# Patient Record
Sex: MALE | Race: WHITE | Employment: OTHER | URBAN - METROPOLITAN AREA
[De-identification: names, ages, dates, MRNs, and addresses within clinical notes are randomized per-mention and may not be internally consistent; named-entity substitution may affect disease eponyms.]

---

## 2024-05-23 ENCOUNTER — HOSPITAL ENCOUNTER (OUTPATIENT)
Facility: HOSPITAL | Age: 79
Discharge: HOME | End: 2024-05-23
Payer: MEDICARE

## 2024-05-23 DIAGNOSIS — Z01.818 PRE-OP TESTING: Primary | ICD-10-CM

## 2024-05-23 LAB
ALBUMIN SERPL-MCNC: 3.7 G/DL (ref 3.4–5)
ALBUMIN/GLOB SERPL: 1.3 (ref 0.8–1.7)
ALP SERPL-CCNC: 89 U/L (ref 45–117)
ALT SERPL-CCNC: 20 U/L (ref 16–61)
ANION GAP SERPL CALC-SCNC: 5 MMOL/L (ref 3–18)
AST SERPL-CCNC: 12 U/L (ref 10–38)
BASOPHILS # BLD: 0.1 K/UL (ref 0–0.1)
BASOPHILS NFR BLD: 1 % (ref 0–2)
BILIRUB SERPL-MCNC: 0.5 MG/DL (ref 0.2–1)
BUN SERPL-MCNC: 19 MG/DL (ref 7–18)
BUN/CREAT SERPL: 18 (ref 12–20)
CALCIUM SERPL-MCNC: 9.6 MG/DL (ref 8.5–10.1)
CHLORIDE SERPL-SCNC: 103 MMOL/L (ref 100–111)
CO2 SERPL-SCNC: 31 MMOL/L (ref 21–32)
CREAT SERPL-MCNC: 1.04 MG/DL (ref 0.6–1.3)
DIFFERENTIAL METHOD BLD: NORMAL
EOSINOPHIL # BLD: 0.2 K/UL (ref 0–0.4)
EOSINOPHIL NFR BLD: 3 % (ref 0–5)
ERYTHROCYTE [DISTWIDTH] IN BLOOD BY AUTOMATED COUNT: 14.4 % (ref 11.6–14.5)
GLOBULIN SER CALC-MCNC: 2.9 G/DL (ref 2–4)
GLUCOSE SERPL-MCNC: 124 MG/DL (ref 74–99)
HCT VFR BLD AUTO: 40.9 % (ref 36–48)
HGB BLD-MCNC: 13.6 G/DL (ref 13–16)
IMM GRANULOCYTES # BLD AUTO: 0 K/UL (ref 0–0.04)
IMM GRANULOCYTES NFR BLD AUTO: 0 % (ref 0–0.5)
LYMPHOCYTES # BLD: 2.1 K/UL (ref 0.9–3.6)
LYMPHOCYTES NFR BLD: 33 % (ref 21–52)
MCH RBC QN AUTO: 28.6 PG (ref 24–34)
MCHC RBC AUTO-ENTMCNC: 33.3 G/DL (ref 31–37)
MCV RBC AUTO: 86.1 FL (ref 78–100)
MONOCYTES # BLD: 0.6 K/UL (ref 0.05–1.2)
MONOCYTES NFR BLD: 9 % (ref 3–10)
NEUTS SEG # BLD: 3.5 K/UL (ref 1.8–8)
NEUTS SEG NFR BLD: 55 % (ref 40–73)
NRBC # BLD: 0 K/UL (ref 0–0.01)
NRBC BLD-RTO: 0 PER 100 WBC
PLATELET # BLD AUTO: 207 K/UL (ref 135–420)
PMV BLD AUTO: 10.9 FL (ref 9.2–11.8)
POTASSIUM SERPL-SCNC: 3.4 MMOL/L (ref 3.5–5.5)
PROT SERPL-MCNC: 6.6 G/DL (ref 6.4–8.2)
RBC # BLD AUTO: 4.75 M/UL (ref 4.35–5.65)
SODIUM SERPL-SCNC: 139 MMOL/L (ref 136–145)
WBC # BLD AUTO: 6.5 K/UL (ref 4.6–13.2)

## 2024-05-23 PROCEDURE — 93005 ELECTROCARDIOGRAM TRACING: CPT | Performed by: ORTHOPAEDIC SURGERY

## 2024-05-23 PROCEDURE — 80053 COMPREHEN METABOLIC PANEL: CPT

## 2024-05-23 PROCEDURE — 85025 COMPLETE CBC W/AUTO DIFF WBC: CPT

## 2024-05-24 LAB
EKG ATRIAL RATE: 50 BPM
EKG DIAGNOSIS: NORMAL
EKG P AXIS: 53 DEGREES
EKG P-R INTERVAL: 144 MS
EKG Q-T INTERVAL: 498 MS
EKG QRS DURATION: 94 MS
EKG QTC CALCULATION (BAZETT): 454 MS
EKG R AXIS: -35 DEGREES
EKG T AXIS: -1 DEGREES
EKG VENTRICULAR RATE: 50 BPM

## 2024-05-25 LAB
BACTERIA SPEC CULT: NORMAL
BACTERIA SPEC CULT: NORMAL
SERVICE CMNT-IMP: NORMAL

## 2024-06-09 NOTE — H&P
Take 3 tablets by mouth daily      esomeprazole Magnesium (NEXIUM) 20 MG PACK Take 1 packet by mouth daily as needed      metoprolol succinate (TOPROL XL) 50 MG extended release tablet Take 1 tablet by mouth daily      hydroCHLOROthiazide (HYDRODIURIL) 25 MG tablet Take 1 tablet by mouth daily      lisinopril (PRINIVIL;ZESTRIL) 10 MG tablet Take 1 tablet by mouth daily      simvastatin (ZOCOR) 40 MG tablet Take 1 tablet by mouth daily      Potassium Chloride 10 MEQ PACK Take 20 mEq by mouth daily      LORazepam (ATIVAN) 0.5 MG tablet Take 1 tablet by mouth 2 times daily as needed for Anxiety. Indications: anxiety Max Daily Amount: 1 mg      traZODone (DESYREL) 50 MG tablet Take 1 tablet by mouth nightly as needed for Sleep      dicyclomine (BENTYL) 20 MG tablet Take 1 tablet by mouth daily as needed         ROS:  Denies chills, fever,night sweats,  bowel or bladder dysfunction, unexplained weight loss/weight gain, chest pain, sob or anxiety.    Physical Examination    Gen: Well developed, well nourished 78 y.o. male. He has weakness in his EHL and tib ant with normal strength of the hamstrings and quads. He is limited in duration and ability to ambulate. He does have subtle numbness as well.    Assessment and Plan    It appears that the patient is experiencing symptomatic residual and junctional stenosis. Due to the pt's persistent symptoms Vern Stevens is being admitted to undergo surgical intervention. The post-operative plan of care consists of physical therapy/home health if indicated and a 2 week f/u office visit.  The risks, benefits, complications and alternatives to surgery have been discussed in detail with the patient.  The patient understands and agrees to proceed.

## 2024-06-11 NOTE — DISCHARGE INSTRUCTIONS
Dr. Ruiz's Post-Operative Instructions Thoracic/Lumbar/Sacral Spine Surgery    DO NOT take any NSAIDS if you had Fusion Surgery.     ACTIVITIES:  *The first week after surgery   Change positions every hour while you are awake.  Walking is the best way to rebuild strength.   Activities around the house, such as washing dishes and preparing light meals are fine.   Avoid strenuous activities, such as vacuuming, and do not lift anything heavier than 1 gallon of milk (or about 5-8 pounds).   Do not bend over to  items from the ground level until 3 months post-op.    *Week 2 and beyond  You may gradually increase your activities, but avoid heavy lifting, pushing/pulling.   Walk at a pace that avoids fatigue or severe pain. Do not try to walk several blocks the first day! As you increase the distance, you may feel tired. If so, stop and rest.   Follow-up with Dr. Ruiz will be 2 weeks after surgery.    BATHING and INCISION CARE:  The incision may be tender or feel numb: this is normal.   Keep the incision clean and dry. You may shower 3 days after surgery. Cover the dressing with saran wrap before getting in the shower. The incision is closed with sutures under the skin and glue on top.   Do not apply any lotions, ointments or oils on the incision.   Do not remove the dressing. Your dressing will be changed at your first post op appointment. If it comes loose or is damaged, dirty or wet before this appointment, call your home health nurse (if you are being seen by a nurse at home) or the office to have the dressing changed.  If you notice any excessive swelling, redness, or persistent drainage around the incision, notify the office immediately.    CONSTIPATION:  Take a stool softener twice a day while you are taking a narcotic.   If you have not had a bowel movement within 3 days of surgery, you will need to use a laxative or suppository that can be obtained over the counter at your local pharmacy     ICE  Use

## 2024-06-12 ENCOUNTER — HOSPITAL ENCOUNTER (OUTPATIENT)
Facility: HOSPITAL | Age: 79
Setting detail: OBSERVATION
Discharge: HOME HEALTH | End: 2024-06-13
Attending: ORTHOPAEDIC SURGERY | Admitting: ORTHOPAEDIC SURGERY
Payer: MEDICARE

## 2024-06-12 ENCOUNTER — APPOINTMENT (OUTPATIENT)
Facility: HOSPITAL | Age: 79
End: 2024-06-12
Attending: ORTHOPAEDIC SURGERY
Payer: MEDICARE

## 2024-06-12 ENCOUNTER — ANESTHESIA EVENT (OUTPATIENT)
Facility: HOSPITAL | Age: 79
End: 2024-06-12
Payer: MEDICARE

## 2024-06-12 ENCOUNTER — ANESTHESIA (OUTPATIENT)
Facility: HOSPITAL | Age: 79
End: 2024-06-12
Payer: MEDICARE

## 2024-06-12 DIAGNOSIS — Z98.890 S/P LAMINECTOMY: Primary | ICD-10-CM

## 2024-06-12 PROBLEM — M48.061 SPINAL STENOSIS OF LUMBAR REGION AT MULTIPLE LEVELS: Status: ACTIVE | Noted: 2024-06-12

## 2024-06-12 PROCEDURE — 2720000010 HC SURG SUPPLY STERILE: Performed by: ORTHOPAEDIC SURGERY

## 2024-06-12 PROCEDURE — 2580000003 HC RX 258: Performed by: ORTHOPAEDIC SURGERY

## 2024-06-12 PROCEDURE — 2709999900 HC NON-CHARGEABLE SUPPLY: Performed by: ORTHOPAEDIC SURGERY

## 2024-06-12 PROCEDURE — 7100000001 HC PACU RECOVERY - ADDTL 15 MIN: Performed by: ORTHOPAEDIC SURGERY

## 2024-06-12 PROCEDURE — 6360000002 HC RX W HCPCS: Performed by: ORTHOPAEDIC SURGERY

## 2024-06-12 PROCEDURE — 6360000002 HC RX W HCPCS: Performed by: NURSE ANESTHETIST, CERTIFIED REGISTERED

## 2024-06-12 PROCEDURE — 3600000002 HC SURGERY LEVEL 2 BASE: Performed by: ORTHOPAEDIC SURGERY

## 2024-06-12 PROCEDURE — G0378 HOSPITAL OBSERVATION PER HR: HCPCS

## 2024-06-12 PROCEDURE — 2500000003 HC RX 250 WO HCPCS: Performed by: NURSE ANESTHETIST, CERTIFIED REGISTERED

## 2024-06-12 PROCEDURE — 6360000002 HC RX W HCPCS: Performed by: STUDENT IN AN ORGANIZED HEALTH CARE EDUCATION/TRAINING PROGRAM

## 2024-06-12 PROCEDURE — C1729 CATH, DRAINAGE: HCPCS | Performed by: ORTHOPAEDIC SURGERY

## 2024-06-12 PROCEDURE — 6370000000 HC RX 637 (ALT 250 FOR IP): Performed by: ORTHOPAEDIC SURGERY

## 2024-06-12 PROCEDURE — 7100000000 HC PACU RECOVERY - FIRST 15 MIN: Performed by: ORTHOPAEDIC SURGERY

## 2024-06-12 PROCEDURE — 77002 NEEDLE LOCALIZATION BY XRAY: CPT

## 2024-06-12 PROCEDURE — 2500000003 HC RX 250 WO HCPCS: Performed by: ORTHOPAEDIC SURGERY

## 2024-06-12 PROCEDURE — 3700000001 HC ADD 15 MINUTES (ANESTHESIA): Performed by: ORTHOPAEDIC SURGERY

## 2024-06-12 PROCEDURE — 3700000000 HC ANESTHESIA ATTENDED CARE: Performed by: ORTHOPAEDIC SURGERY

## 2024-06-12 PROCEDURE — 3600000012 HC SURGERY LEVEL 2 ADDTL 15MIN: Performed by: ORTHOPAEDIC SURGERY

## 2024-06-12 RX ORDER — LIDOCAINE HYDROCHLORIDE 20 MG/ML
INJECTION, SOLUTION EPIDURAL; INFILTRATION; INTRACAUDAL; PERINEURAL PRN
Status: DISCONTINUED | OUTPATIENT
Start: 2024-06-12 | End: 2024-06-12 | Stop reason: SDUPTHER

## 2024-06-12 RX ORDER — METAXALONE 800 MG/1
800 TABLET ORAL EVERY 8 HOURS PRN
Status: DISCONTINUED | OUTPATIENT
Start: 2024-06-12 | End: 2024-06-13 | Stop reason: HOSPADM

## 2024-06-12 RX ORDER — ESOMEPRAZOLE MAGNESIUM 20 MG/1
20 GRANULE, DELAYED RELEASE ORAL 2 TIMES DAILY
Status: DISCONTINUED | OUTPATIENT
Start: 2024-06-12 | End: 2024-06-12 | Stop reason: CLARIF

## 2024-06-12 RX ORDER — FENTANYL CITRATE 50 UG/ML
50 INJECTION, SOLUTION INTRAMUSCULAR; INTRAVENOUS EVERY 5 MIN PRN
Status: COMPLETED | OUTPATIENT
Start: 2024-06-12 | End: 2024-06-12

## 2024-06-12 RX ORDER — ONDANSETRON 4 MG/1
4 TABLET, ORALLY DISINTEGRATING ORAL EVERY 8 HOURS PRN
Status: DISCONTINUED | OUTPATIENT
Start: 2024-06-12 | End: 2024-06-13 | Stop reason: HOSPADM

## 2024-06-12 RX ORDER — NALOXONE HYDROCHLORIDE 0.4 MG/ML
INJECTION, SOLUTION INTRAMUSCULAR; INTRAVENOUS; SUBCUTANEOUS PRN
Status: DISCONTINUED | OUTPATIENT
Start: 2024-06-12 | End: 2024-06-12 | Stop reason: HOSPADM

## 2024-06-12 RX ORDER — DROPERIDOL 2.5 MG/ML
0.62 INJECTION, SOLUTION INTRAMUSCULAR; INTRAVENOUS
Status: DISCONTINUED | OUTPATIENT
Start: 2024-06-12 | End: 2024-06-12 | Stop reason: HOSPADM

## 2024-06-12 RX ORDER — DICYCLOMINE HCL 20 MG
20 TABLET ORAL
Status: DISCONTINUED | OUTPATIENT
Start: 2024-06-12 | End: 2024-06-12 | Stop reason: RX

## 2024-06-12 RX ORDER — ROCURONIUM BROMIDE 10 MG/ML
INJECTION, SOLUTION INTRAVENOUS PRN
Status: DISCONTINUED | OUTPATIENT
Start: 2024-06-12 | End: 2024-06-12 | Stop reason: SDUPTHER

## 2024-06-12 RX ORDER — ONDANSETRON 2 MG/ML
4 INJECTION INTRAMUSCULAR; INTRAVENOUS
Status: DISCONTINUED | OUTPATIENT
Start: 2024-06-12 | End: 2024-06-12 | Stop reason: HOSPADM

## 2024-06-12 RX ORDER — ACETAMINOPHEN 325 MG/1
650 TABLET ORAL EVERY 6 HOURS
Status: DISCONTINUED | OUTPATIENT
Start: 2024-06-12 | End: 2024-06-13 | Stop reason: HOSPADM

## 2024-06-12 RX ORDER — DICYCLOMINE HYDROCHLORIDE 10 MG/1
20 CAPSULE ORAL
Status: DISCONTINUED | OUTPATIENT
Start: 2024-06-12 | End: 2024-06-13 | Stop reason: HOSPADM

## 2024-06-12 RX ORDER — LISINOPRIL 5 MG/1
5 TABLET ORAL DAILY
Status: DISCONTINUED | OUTPATIENT
Start: 2024-06-12 | End: 2024-06-13 | Stop reason: HOSPADM

## 2024-06-12 RX ORDER — PROPOFOL 10 MG/ML
INJECTION, EMULSION INTRAVENOUS PRN
Status: DISCONTINUED | OUTPATIENT
Start: 2024-06-12 | End: 2024-06-12 | Stop reason: SDUPTHER

## 2024-06-12 RX ORDER — POLYETHYLENE GLYCOL 3350 17 G/17G
17 POWDER, FOR SOLUTION ORAL DAILY
Status: DISCONTINUED | OUTPATIENT
Start: 2024-06-12 | End: 2024-06-13 | Stop reason: HOSPADM

## 2024-06-12 RX ORDER — BUPIVACAINE HYDROCHLORIDE AND EPINEPHRINE 2.5; 5 MG/ML; UG/ML
INJECTION, SOLUTION EPIDURAL; INFILTRATION; INTRACAUDAL; PERINEURAL PRN
Status: DISCONTINUED | OUTPATIENT
Start: 2024-06-12 | End: 2024-06-12 | Stop reason: ALTCHOICE

## 2024-06-12 RX ORDER — SODIUM CHLORIDE, SODIUM LACTATE, POTASSIUM CHLORIDE, CALCIUM CHLORIDE 600; 310; 30; 20 MG/100ML; MG/100ML; MG/100ML; MG/100ML
INJECTION, SOLUTION INTRAVENOUS CONTINUOUS
Status: DISCONTINUED | OUTPATIENT
Start: 2024-06-12 | End: 2024-06-12 | Stop reason: HOSPADM

## 2024-06-12 RX ORDER — VANCOMYCIN HYDROCHLORIDE 1 G/20ML
INJECTION, POWDER, LYOPHILIZED, FOR SOLUTION INTRAVENOUS PRN
Status: DISCONTINUED | OUTPATIENT
Start: 2024-06-12 | End: 2024-06-12 | Stop reason: ALTCHOICE

## 2024-06-12 RX ORDER — SODIUM CHLORIDE 9 MG/ML
INJECTION, SOLUTION INTRAVENOUS PRN
Status: DISCONTINUED | OUTPATIENT
Start: 2024-06-12 | End: 2024-06-12 | Stop reason: HOSPADM

## 2024-06-12 RX ORDER — MIDAZOLAM HYDROCHLORIDE 2 MG/2ML
2 INJECTION, SOLUTION INTRAMUSCULAR; INTRAVENOUS
Status: DISCONTINUED | OUTPATIENT
Start: 2024-06-12 | End: 2024-06-12 | Stop reason: HOSPADM

## 2024-06-12 RX ORDER — BISACODYL 5 MG/1
5 TABLET, DELAYED RELEASE ORAL DAILY
Status: DISCONTINUED | OUTPATIENT
Start: 2024-06-12 | End: 2024-06-13 | Stop reason: HOSPADM

## 2024-06-12 RX ORDER — SODIUM CHLORIDE 0.9 % (FLUSH) 0.9 %
5-40 SYRINGE (ML) INJECTION PRN
Status: DISCONTINUED | OUTPATIENT
Start: 2024-06-12 | End: 2024-06-12 | Stop reason: HOSPADM

## 2024-06-12 RX ORDER — SODIUM CHLORIDE 0.9 % (FLUSH) 0.9 %
5-40 SYRINGE (ML) INJECTION EVERY 12 HOURS SCHEDULED
Status: DISCONTINUED | OUTPATIENT
Start: 2024-06-12 | End: 2024-06-12 | Stop reason: HOSPADM

## 2024-06-12 RX ORDER — DIPHENHYDRAMINE HCL 25 MG
25 CAPSULE ORAL EVERY 6 HOURS PRN
Status: DISCONTINUED | OUTPATIENT
Start: 2024-06-12 | End: 2024-06-13 | Stop reason: HOSPADM

## 2024-06-12 RX ORDER — HYDROMORPHONE HYDROCHLORIDE 1 MG/ML
0.25 INJECTION, SOLUTION INTRAMUSCULAR; INTRAVENOUS; SUBCUTANEOUS
Status: DISCONTINUED | OUTPATIENT
Start: 2024-06-12 | End: 2024-06-13 | Stop reason: HOSPADM

## 2024-06-12 RX ORDER — OXYCODONE HYDROCHLORIDE 5 MG/1
5 TABLET ORAL EVERY 4 HOURS PRN
Status: DISCONTINUED | OUTPATIENT
Start: 2024-06-12 | End: 2024-06-13 | Stop reason: HOSPADM

## 2024-06-12 RX ORDER — METOPROLOL SUCCINATE 50 MG/1
50 TABLET, EXTENDED RELEASE ORAL DAILY
Status: DISCONTINUED | OUTPATIENT
Start: 2024-06-12 | End: 2024-06-13 | Stop reason: HOSPADM

## 2024-06-12 RX ORDER — HYDROMORPHONE HYDROCHLORIDE 1 MG/ML
0.5 INJECTION, SOLUTION INTRAMUSCULAR; INTRAVENOUS; SUBCUTANEOUS
Status: DISCONTINUED | OUTPATIENT
Start: 2024-06-12 | End: 2024-06-13 | Stop reason: HOSPADM

## 2024-06-12 RX ORDER — OXYCODONE HYDROCHLORIDE 5 MG/1
10 TABLET ORAL EVERY 4 HOURS PRN
Status: DISCONTINUED | OUTPATIENT
Start: 2024-06-12 | End: 2024-06-13 | Stop reason: HOSPADM

## 2024-06-12 RX ORDER — TRAZODONE HYDROCHLORIDE 50 MG/1
25 TABLET ORAL NIGHTLY PRN
Status: DISCONTINUED | OUTPATIENT
Start: 2024-06-12 | End: 2024-06-13 | Stop reason: HOSPADM

## 2024-06-12 RX ORDER — DIPHENHYDRAMINE HYDROCHLORIDE 50 MG/ML
25 INJECTION INTRAMUSCULAR; INTRAVENOUS EVERY 6 HOURS PRN
Status: DISCONTINUED | OUTPATIENT
Start: 2024-06-12 | End: 2024-06-13 | Stop reason: HOSPADM

## 2024-06-12 RX ORDER — TRANEXAMIC ACID 10 MG/ML
1000 INJECTION, SOLUTION INTRAVENOUS ONCE
Status: COMPLETED | OUTPATIENT
Start: 2024-06-12 | End: 2024-06-12

## 2024-06-12 RX ORDER — HYDROCHLOROTHIAZIDE 25 MG/1
25 TABLET ORAL DAILY
Status: DISCONTINUED | OUTPATIENT
Start: 2024-06-12 | End: 2024-06-13 | Stop reason: HOSPADM

## 2024-06-12 RX ORDER — SODIUM CHLORIDE, SODIUM LACTATE, POTASSIUM CHLORIDE, CALCIUM CHLORIDE 600; 310; 30; 20 MG/100ML; MG/100ML; MG/100ML; MG/100ML
INJECTION, SOLUTION INTRAVENOUS CONTINUOUS
Status: DISCONTINUED | OUTPATIENT
Start: 2024-06-12 | End: 2024-06-13 | Stop reason: HOSPADM

## 2024-06-12 RX ORDER — DIPHENHYDRAMINE HYDROCHLORIDE 50 MG/ML
12.5 INJECTION INTRAMUSCULAR; INTRAVENOUS
Status: DISCONTINUED | OUTPATIENT
Start: 2024-06-12 | End: 2024-06-12 | Stop reason: HOSPADM

## 2024-06-12 RX ORDER — HYDROMORPHONE HYDROCHLORIDE 2 MG/ML
INJECTION, SOLUTION INTRAMUSCULAR; INTRAVENOUS; SUBCUTANEOUS PRN
Status: DISCONTINUED | OUTPATIENT
Start: 2024-06-12 | End: 2024-06-12 | Stop reason: SDUPTHER

## 2024-06-12 RX ORDER — OXYCODONE HYDROCHLORIDE 5 MG/1
5 TABLET ORAL PRN
Status: DISCONTINUED | OUTPATIENT
Start: 2024-06-12 | End: 2024-06-12 | Stop reason: HOSPADM

## 2024-06-12 RX ORDER — FENTANYL CITRATE 50 UG/ML
INJECTION, SOLUTION INTRAMUSCULAR; INTRAVENOUS PRN
Status: DISCONTINUED | OUTPATIENT
Start: 2024-06-12 | End: 2024-06-12 | Stop reason: SDUPTHER

## 2024-06-12 RX ORDER — LABETALOL HYDROCHLORIDE 5 MG/ML
10 INJECTION, SOLUTION INTRAVENOUS
Status: DISCONTINUED | OUTPATIENT
Start: 2024-06-12 | End: 2024-06-12 | Stop reason: HOSPADM

## 2024-06-12 RX ORDER — POTASSIUM CHLORIDE 1500 MG/1
20 TABLET, EXTENDED RELEASE ORAL DAILY
Status: DISCONTINUED | OUTPATIENT
Start: 2024-06-12 | End: 2024-06-12 | Stop reason: RX

## 2024-06-12 RX ORDER — HYDROMORPHONE HYDROCHLORIDE 1 MG/ML
0.5 INJECTION, SOLUTION INTRAMUSCULAR; INTRAVENOUS; SUBCUTANEOUS EVERY 5 MIN PRN
Status: DISCONTINUED | OUTPATIENT
Start: 2024-06-12 | End: 2024-06-12 | Stop reason: HOSPADM

## 2024-06-12 RX ORDER — DEXAMETHASONE SODIUM PHOSPHATE 4 MG/ML
INJECTION, SOLUTION INTRA-ARTICULAR; INTRALESIONAL; INTRAMUSCULAR; INTRAVENOUS; SOFT TISSUE PRN
Status: DISCONTINUED | OUTPATIENT
Start: 2024-06-12 | End: 2024-06-12 | Stop reason: SDUPTHER

## 2024-06-12 RX ORDER — FAMOTIDINE 20 MG/1
20 TABLET, FILM COATED ORAL 2 TIMES DAILY
Status: DISCONTINUED | OUTPATIENT
Start: 2024-06-12 | End: 2024-06-13 | Stop reason: HOSPADM

## 2024-06-12 RX ORDER — LORAZEPAM 0.5 MG/1
0.5 TABLET ORAL 2 TIMES DAILY PRN
Status: DISCONTINUED | OUTPATIENT
Start: 2024-06-12 | End: 2024-06-13 | Stop reason: HOSPADM

## 2024-06-12 RX ORDER — PANTOPRAZOLE SODIUM 40 MG/1
40 TABLET, DELAYED RELEASE ORAL
Status: DISCONTINUED | OUTPATIENT
Start: 2024-06-13 | End: 2024-06-13 | Stop reason: HOSPADM

## 2024-06-12 RX ORDER — OXYCODONE HYDROCHLORIDE 5 MG/1
10 TABLET ORAL PRN
Status: DISCONTINUED | OUTPATIENT
Start: 2024-06-12 | End: 2024-06-12 | Stop reason: HOSPADM

## 2024-06-12 RX ORDER — MIDAZOLAM HYDROCHLORIDE 1 MG/ML
INJECTION INTRAMUSCULAR; INTRAVENOUS PRN
Status: DISCONTINUED | OUTPATIENT
Start: 2024-06-12 | End: 2024-06-12 | Stop reason: SDUPTHER

## 2024-06-12 RX ORDER — SODIUM CHLORIDE 0.9 % (FLUSH) 0.9 %
5-40 SYRINGE (ML) INJECTION EVERY 12 HOURS SCHEDULED
Status: DISCONTINUED | OUTPATIENT
Start: 2024-06-12 | End: 2024-06-13 | Stop reason: HOSPADM

## 2024-06-12 RX ORDER — ONDANSETRON 2 MG/ML
INJECTION INTRAMUSCULAR; INTRAVENOUS PRN
Status: DISCONTINUED | OUTPATIENT
Start: 2024-06-12 | End: 2024-06-12 | Stop reason: SDUPTHER

## 2024-06-12 RX ORDER — ONDANSETRON 2 MG/ML
4 INJECTION INTRAMUSCULAR; INTRAVENOUS EVERY 6 HOURS PRN
Status: DISCONTINUED | OUTPATIENT
Start: 2024-06-12 | End: 2024-06-13 | Stop reason: HOSPADM

## 2024-06-12 RX ORDER — SODIUM CHLORIDE 450 MG/100ML
INJECTION, SOLUTION INTRAVENOUS CONTINUOUS
Status: DISCONTINUED | OUTPATIENT
Start: 2024-06-12 | End: 2024-06-13 | Stop reason: HOSPADM

## 2024-06-12 RX ORDER — POTASSIUM CHLORIDE 20 MEQ/1
20 TABLET, EXTENDED RELEASE ORAL
Status: DISCONTINUED | OUTPATIENT
Start: 2024-06-12 | End: 2024-06-13 | Stop reason: HOSPADM

## 2024-06-12 RX ADMIN — SODIUM CHLORIDE, POTASSIUM CHLORIDE, SODIUM LACTATE AND CALCIUM CHLORIDE: 600; 310; 30; 20 INJECTION, SOLUTION INTRAVENOUS at 17:32

## 2024-06-12 RX ADMIN — HYDROCHLOROTHIAZIDE 25 MG: 25 TABLET ORAL at 17:45

## 2024-06-12 RX ADMIN — OXYCODONE HYDROCHLORIDE 5 MG: 5 TABLET ORAL at 20:46

## 2024-06-12 RX ADMIN — LIDOCAINE HYDROCHLORIDE 100 MG: 20 INJECTION, SOLUTION EPIDURAL; INFILTRATION; INTRACAUDAL; PERINEURAL at 12:42

## 2024-06-12 RX ADMIN — ACETAMINOPHEN 650 MG: 325 TABLET ORAL at 17:43

## 2024-06-12 RX ADMIN — FENTANYL CITRATE 50 MCG: 50 INJECTION INTRAMUSCULAR; INTRAVENOUS at 15:55

## 2024-06-12 RX ADMIN — SODIUM CHLORIDE, POTASSIUM CHLORIDE, SODIUM LACTATE AND CALCIUM CHLORIDE: 600; 310; 30; 20 INJECTION, SOLUTION INTRAVENOUS at 10:28

## 2024-06-12 RX ADMIN — ACETAMINOPHEN 650 MG: 325 TABLET ORAL at 23:29

## 2024-06-12 RX ADMIN — FENTANYL CITRATE 50 MCG: 50 INJECTION INTRAMUSCULAR; INTRAVENOUS at 15:42

## 2024-06-12 RX ADMIN — LISINOPRIL 5 MG: 5 TABLET ORAL at 17:45

## 2024-06-12 RX ADMIN — HYDROMORPHONE HYDROCHLORIDE 0.25 MG: 2 INJECTION, SOLUTION INTRAMUSCULAR; INTRAVENOUS; SUBCUTANEOUS at 14:28

## 2024-06-12 RX ADMIN — WATER 2000 MG: 1 INJECTION INTRAMUSCULAR; INTRAVENOUS; SUBCUTANEOUS at 12:39

## 2024-06-12 RX ADMIN — WATER 2000 MG: 1 INJECTION INTRAMUSCULAR; INTRAVENOUS; SUBCUTANEOUS at 20:46

## 2024-06-12 RX ADMIN — MIDAZOLAM 2 MG: 1 INJECTION INTRAMUSCULAR; INTRAVENOUS at 12:37

## 2024-06-12 RX ADMIN — DEXAMETHASONE SODIUM PHOSPHATE 4 MG: 4 INJECTION, SOLUTION INTRAMUSCULAR; INTRAVENOUS at 13:29

## 2024-06-12 RX ADMIN — SUGAMMADEX 100 MG: 100 INJECTION, SOLUTION INTRAVENOUS at 15:10

## 2024-06-12 RX ADMIN — ROCURONIUM BROMIDE 20 MG: 10 INJECTION, SOLUTION INTRAVENOUS at 13:43

## 2024-06-12 RX ADMIN — BISACODYL 5 MG: 5 TABLET, COATED ORAL at 17:44

## 2024-06-12 RX ADMIN — TRANEXAMIC ACID 1000 MG: 10 INJECTION, SOLUTION INTRAVENOUS at 14:54

## 2024-06-12 RX ADMIN — POTASSIUM CHLORIDE 20 MEQ: 1500 TABLET, EXTENDED RELEASE ORAL at 18:47

## 2024-06-12 RX ADMIN — FENTANYL CITRATE 50 MCG: 50 INJECTION INTRAMUSCULAR; INTRAVENOUS at 16:08

## 2024-06-12 RX ADMIN — HYDROMORPHONE HYDROCHLORIDE 0.25 MG: 2 INJECTION, SOLUTION INTRAMUSCULAR; INTRAVENOUS; SUBCUTANEOUS at 14:05

## 2024-06-12 RX ADMIN — POLYETHYLENE GLYCOL 3350 17 G: 17 POWDER, FOR SOLUTION ORAL at 17:45

## 2024-06-12 RX ADMIN — FAMOTIDINE 20 MG: 20 TABLET, FILM COATED ORAL at 20:47

## 2024-06-12 RX ADMIN — HYDROMORPHONE HYDROCHLORIDE 0.5 MG: 2 INJECTION, SOLUTION INTRAMUSCULAR; INTRAVENOUS; SUBCUTANEOUS at 13:17

## 2024-06-12 RX ADMIN — FENTANYL CITRATE 50 MCG: 50 INJECTION, SOLUTION INTRAMUSCULAR; INTRAVENOUS at 12:42

## 2024-06-12 RX ADMIN — TRANEXAMIC ACID 1000 MG: 10 INJECTION, SOLUTION INTRAVENOUS at 13:06

## 2024-06-12 RX ADMIN — SODIUM CHLORIDE: 4.5 INJECTION, SOLUTION INTRAVENOUS at 17:40

## 2024-06-12 RX ADMIN — SUGAMMADEX 100 MG: 100 INJECTION, SOLUTION INTRAVENOUS at 15:04

## 2024-06-12 RX ADMIN — ROCURONIUM BROMIDE 50 MG: 10 INJECTION, SOLUTION INTRAVENOUS at 12:44

## 2024-06-12 RX ADMIN — ONDANSETRON 4 MG: 2 INJECTION INTRAMUSCULAR; INTRAVENOUS at 14:59

## 2024-06-12 RX ADMIN — FENTANYL CITRATE 50 MCG: 50 INJECTION, SOLUTION INTRAMUSCULAR; INTRAVENOUS at 13:34

## 2024-06-12 RX ADMIN — PROPOFOL 150 MG: 10 INJECTION, EMULSION INTRAVENOUS at 12:44

## 2024-06-12 RX ADMIN — FENTANYL CITRATE 50 MCG: 50 INJECTION INTRAMUSCULAR; INTRAVENOUS at 15:32

## 2024-06-12 ASSESSMENT — PAIN DESCRIPTION - LOCATION
LOCATION: BACK
LOCATION: BACK;GROIN;LEG
LOCATION: BACK

## 2024-06-12 ASSESSMENT — PAIN DESCRIPTION - ORIENTATION
ORIENTATION: LOWER

## 2024-06-12 ASSESSMENT — PAIN DESCRIPTION - DESCRIPTORS
DESCRIPTORS: ACHING;THROBBING
DESCRIPTORS: PATIENT UNABLE TO DESCRIBE
DESCRIPTORS: SORE
DESCRIPTORS: PATIENT UNABLE TO DESCRIBE
DESCRIPTORS: SORE
DESCRIPTORS: PATIENT UNABLE TO DESCRIBE
DESCRIPTORS: SORE
DESCRIPTORS: PATIENT UNABLE TO DESCRIBE

## 2024-06-12 ASSESSMENT — PAIN DESCRIPTION - ONSET
ONSET: ON-GOING
ONSET: AWAKENED FROM SLEEP
ONSET: ON-GOING

## 2024-06-12 ASSESSMENT — PAIN DESCRIPTION - FREQUENCY
FREQUENCY: INTERMITTENT
FREQUENCY: CONTINUOUS
FREQUENCY: INTERMITTENT
FREQUENCY: CONTINUOUS

## 2024-06-12 ASSESSMENT — PAIN - FUNCTIONAL ASSESSMENT
PAIN_FUNCTIONAL_ASSESSMENT: PREVENTS OR INTERFERES SOME ACTIVE ACTIVITIES AND ADLS
PAIN_FUNCTIONAL_ASSESSMENT: PREVENTS OR INTERFERES SOME ACTIVE ACTIVITIES AND ADLS
PAIN_FUNCTIONAL_ASSESSMENT: ACTIVITIES ARE NOT PREVENTED
PAIN_FUNCTIONAL_ASSESSMENT: 0-10
PAIN_FUNCTIONAL_ASSESSMENT: ACTIVITIES ARE NOT PREVENTED
PAIN_FUNCTIONAL_ASSESSMENT: PREVENTS OR INTERFERES SOME ACTIVE ACTIVITIES AND ADLS
PAIN_FUNCTIONAL_ASSESSMENT: ACTIVITIES ARE NOT PREVENTED

## 2024-06-12 ASSESSMENT — PAIN SCALES - GENERAL
PAINLEVEL_OUTOF10: 0
PAINLEVEL_OUTOF10: 9
PAINLEVEL_OUTOF10: 0
PAINLEVEL_OUTOF10: 8
PAINLEVEL_OUTOF10: 4
PAINLEVEL_OUTOF10: 10
PAINLEVEL_OUTOF10: 5
PAINLEVEL_OUTOF10: 9
PAINLEVEL_OUTOF10: 8
PAINLEVEL_OUTOF10: 10
PAINLEVEL_OUTOF10: 3
PAINLEVEL_OUTOF10: 4

## 2024-06-12 ASSESSMENT — PAIN DESCRIPTION - PAIN TYPE
TYPE: SURGICAL PAIN

## 2024-06-12 NOTE — ANESTHESIA PRE PROCEDURE
Department of Anesthesiology  Preprocedure Note       Name:  Vern Stevens   Age:  78 y.o.  :  1945                                          MRN:  [id number]         Date:  2024      Surgeon: Surgeon(s):  Bhavik Ruiz MD    Procedure: Procedure(s):  LUMBAR TWO/THREE, LUMBAR THREE/FOUR LAMINECTOMY POSSIBLE FUSION WITH C-ARM OP 23    Medications prior to admission:   Prior to Admission medications    Medication Sig Start Date End Date Taking? Authorizing Provider   Ascorbic Acid (VITAMIN C) 1000 MG tablet Take 1 tablet by mouth daily 13   Jose M Nieto MD   SELENIUM PO Take 200 mg by mouth daily    Jose M Nieto MD   Resveratrol 100 MG CAPS Take 200 mg by mouth daily    Jose M Nieto MD   Zinc 22.5 MG TABS Take by mouth daily    Jose M Nieto MD   l-arginine 500 MG capsule Take 1 capsule by mouth daily    Jose M Nieto MD   Digestive Enzymes (PAPAYA AND ENZYMES PO) Take 3 tablets by mouth daily    Jose M Nieto MD   esomeprazole Magnesium (NEXIUM) 20 MG PACK Take 1 packet by mouth daily as needed    Jose M Nieto MD   metoprolol succinate (TOPROL XL) 50 MG extended release tablet Take 1 tablet by mouth daily    Jose M Nieto MD   hydroCHLOROthiazide (HYDRODIURIL) 25 MG tablet Take 1 tablet by mouth daily    Jose M Nieto MD   lisinopril (PRINIVIL;ZESTRIL) 10 MG tablet Take 1 tablet by mouth daily    Jose M Nieto MD   simvastatin (ZOCOR) 40 MG tablet Take 1 tablet by mouth daily    Jose M Nieto MD   Potassium Chloride 10 MEQ PACK Take 20 mEq by mouth daily    Jose M Nieto MD   LORazepam (ATIVAN) 0.5 MG tablet Take 1 tablet by mouth 2 times daily as needed for Anxiety. Indications: anxiety    ProviderJose M MD   traZODone (DESYREL) 50 MG tablet Take 0.25 tablets by mouth nightly as needed for Sleep    Jose M Nieto MD   dicyclomine (BENTYL) 20 MG tablet Take 1 tablet by mouth daily as

## 2024-06-12 NOTE — PERIOP NOTE
TRANSFER - IN REPORT:    Verbal report received from OR, RN on Vern Stevens  being received from OR for routine progression of patient care      Report consisted of patient's Situation, Background, Assessment and   Recommendations(SBAR).     Information from the following report(s) Adult Overview, Surgery Report, Intake/Output, and MAR was reviewed with the receiving nurse.    Opportunity for questions and clarification was provided.      Assessment completed upon patient's arrival to unit and care assumed.

## 2024-06-12 NOTE — BRIEF OP NOTE
Brief Postoperative Note      Patient: Vern Stevens  YOB: 1945  MRN: [id number]    Date of Procedure: 6/12/2024    Pre-Op Diagnosis Codes:     * Spinal stenosis of lumbar region with neurogenic claudication [M48.062]    Post-Op Diagnosis: Same       Procedure(s):  LUMBAR TWO/THREE, LUMBAR THREE/FOUR LAMINECTOMY  WITH C-ARM OP 23    Surgeon(s):  Michael Ruiz MD    Assistant:  Physician Assistant: Cherelle Valdes PA-C    Anesthesia: General    Estimated Blood Loss (mL): less than 100     Complications: None    Specimens:   * No specimens in log *    Implants:  * No implants in log *      Drains:   Closed/Suction Drain Inferior;Medial Back Bulb (Active)   Site Description Clean, dry & intact 06/12/24 1450   Dressing Status Clean, dry & intact 06/12/24 1450   Drainage Appearance Serosanguinous 06/12/24 1450   Drain Status Compressed 06/12/24 1450       Findings:  Infection Present At Time Of Surgery (PATOS) (choose all levels that have infection present):  No infection present  Other Findings: severe stenosis and scar    Electronically signed by MICHAEL RUIZ MD on 6/12/2024 at 2:54 PM

## 2024-06-12 NOTE — PERIOP NOTE
Reviewed PTA medication list with patient/caregiver and patient/caregiver denies any additional medications.     Patient admits to having a responsible adult care for them at home for at least 24 hours after surgery.    Patient encouraged to use gown warming system and informed that using said warming gown to regulate body temperature prior to a procedure has been shown to help reduce the risks of blood clots and infection.    Patient's pharmacy of choice verified and documented in PTA medication section.    Dual skin assessment & fall risk band verification completed with A Analilia DHALIWAL.

## 2024-06-12 NOTE — INTERVAL H&P NOTE
Update History & Physical    The patient's History and Physical of June 9, 2024 was reviewed with the patient and I examined the patient. There was no change. The surgical site was confirmed by the patient and me.     Plan: The risks, benefits, expected outcome, and alternative to the recommended procedure have been discussed with the patient. Patient understands and wants to proceed with the procedure.     Electronically signed by MICHAEL VALDEZ MD on 6/12/2024 at 9:56 AM

## 2024-06-12 NOTE — PERIOP NOTE
TRANSFER - OUT REPORT:    Verbal report given to ISRA Lantigua on Vern Stevens  being transferred to [address] [place] [address] for routine progression of patient care       Report consisted of patient's Situation, Background, Assessment and   Recommendations(SBAR).     Information from the following report(s) Adult Overview, Surgery Report, Intake/Output, and MAR was reviewed with the receiving nurse.           Lines:   Peripheral IV 06/12/24 Left;Posterior Hand (Active)   Site Assessment Clean, dry & intact 06/12/24 1620   Line Status Infusing 06/12/24 1620   Line Care Connections checked and tightened 06/12/24 1620   Phlebitis Assessment No symptoms 06/12/24 1620   Infiltration Assessment 0 06/12/24 1620   Dressing Status Clean, dry & intact 06/12/24 1620   Dressing Type Transparent 06/12/24 1620        Opportunity for questions and clarification was provided.      Patient transported with:  Registered Nurse

## 2024-06-13 ENCOUNTER — HOME HEALTH ADMISSION (OUTPATIENT)
Age: 79
End: 2024-06-13
Payer: MEDICARE

## 2024-06-13 VITALS
RESPIRATION RATE: 18 BRPM | HEART RATE: 100 BPM | WEIGHT: 169.1 LBS | TEMPERATURE: 97.5 F | BODY MASS INDEX: 25.63 KG/M2 | DIASTOLIC BLOOD PRESSURE: 77 MMHG | SYSTOLIC BLOOD PRESSURE: 145 MMHG | OXYGEN SATURATION: 100 % | HEIGHT: 68 IN

## 2024-06-13 LAB
ANION GAP SERPL CALC-SCNC: 9 MMOL/L (ref 3–18)
BUN SERPL-MCNC: 22 MG/DL (ref 7–18)
BUN/CREAT SERPL: 23 (ref 12–20)
CALCIUM SERPL-MCNC: 8.8 MG/DL (ref 8.5–10.1)
CHLORIDE SERPL-SCNC: 106 MMOL/L (ref 100–111)
CO2 SERPL-SCNC: 24 MMOL/L (ref 21–32)
CREAT SERPL-MCNC: 0.94 MG/DL (ref 0.6–1.3)
ERYTHROCYTE [DISTWIDTH] IN BLOOD BY AUTOMATED COUNT: 14 % (ref 11.6–14.5)
GLUCOSE SERPL-MCNC: 118 MG/DL (ref 74–99)
HCT VFR BLD AUTO: 37.1 % (ref 36–48)
HGB BLD-MCNC: 11.8 G/DL (ref 13–16)
MCH RBC QN AUTO: 28.2 PG (ref 24–34)
MCHC RBC AUTO-ENTMCNC: 31.8 G/DL (ref 31–37)
MCV RBC AUTO: 88.5 FL (ref 78–100)
NRBC # BLD: 0 K/UL (ref 0–0.01)
NRBC BLD-RTO: 0 PER 100 WBC
PLATELET # BLD AUTO: 233 K/UL (ref 135–420)
PMV BLD AUTO: 10.9 FL (ref 9.2–11.8)
POTASSIUM SERPL-SCNC: 4.4 MMOL/L (ref 3.5–5.5)
RBC # BLD AUTO: 4.19 M/UL (ref 4.35–5.65)
SODIUM SERPL-SCNC: 139 MMOL/L (ref 136–145)
WBC # BLD AUTO: 14.7 K/UL (ref 4.6–13.2)

## 2024-06-13 PROCEDURE — G0378 HOSPITAL OBSERVATION PER HR: HCPCS

## 2024-06-13 PROCEDURE — 6360000002 HC RX W HCPCS: Performed by: ORTHOPAEDIC SURGERY

## 2024-06-13 PROCEDURE — 97161 PT EVAL LOW COMPLEX 20 MIN: CPT

## 2024-06-13 PROCEDURE — 80048 BASIC METABOLIC PNL TOTAL CA: CPT

## 2024-06-13 PROCEDURE — 97165 OT EVAL LOW COMPLEX 30 MIN: CPT

## 2024-06-13 PROCEDURE — 36415 COLL VENOUS BLD VENIPUNCTURE: CPT

## 2024-06-13 PROCEDURE — 85027 COMPLETE CBC AUTOMATED: CPT

## 2024-06-13 PROCEDURE — 97530 THERAPEUTIC ACTIVITIES: CPT

## 2024-06-13 PROCEDURE — 6370000000 HC RX 637 (ALT 250 FOR IP): Performed by: ORTHOPAEDIC SURGERY

## 2024-06-13 PROCEDURE — 97535 SELF CARE MNGMENT TRAINING: CPT

## 2024-06-13 PROCEDURE — 2580000003 HC RX 258: Performed by: ORTHOPAEDIC SURGERY

## 2024-06-13 PROCEDURE — 97116 GAIT TRAINING THERAPY: CPT

## 2024-06-13 RX ORDER — OXYCODONE HYDROCHLORIDE 5 MG/1
5 TABLET ORAL EVERY 6 HOURS PRN
Qty: 28 TABLET | Refills: 0 | Status: SHIPPED | OUTPATIENT
Start: 2024-06-13 | End: 2024-06-20

## 2024-06-13 RX ADMIN — OXYCODONE HYDROCHLORIDE 10 MG: 5 TABLET ORAL at 02:37

## 2024-06-13 RX ADMIN — ACETAMINOPHEN 650 MG: 325 TABLET ORAL at 04:50

## 2024-06-13 RX ADMIN — METOPROLOL SUCCINATE 50 MG: 50 TABLET, EXTENDED RELEASE ORAL at 09:16

## 2024-06-13 RX ADMIN — ACETAMINOPHEN 650 MG: 325 TABLET ORAL at 11:06

## 2024-06-13 RX ADMIN — WATER 2000 MG: 1 INJECTION INTRAMUSCULAR; INTRAVENOUS; SUBCUTANEOUS at 04:50

## 2024-06-13 RX ADMIN — HYDROCHLOROTHIAZIDE 25 MG: 25 TABLET ORAL at 09:16

## 2024-06-13 RX ADMIN — LISINOPRIL 5 MG: 5 TABLET ORAL at 09:16

## 2024-06-13 RX ADMIN — POTASSIUM CHLORIDE 20 MEQ: 1500 TABLET, EXTENDED RELEASE ORAL at 09:16

## 2024-06-13 RX ADMIN — SODIUM CHLORIDE: 4.5 INJECTION, SOLUTION INTRAVENOUS at 04:54

## 2024-06-13 RX ADMIN — FAMOTIDINE 20 MG: 20 TABLET, FILM COATED ORAL at 09:16

## 2024-06-13 RX ADMIN — PANTOPRAZOLE SODIUM 40 MG: 40 TABLET, DELAYED RELEASE ORAL at 05:11

## 2024-06-13 ASSESSMENT — PAIN DESCRIPTION - ONSET
ONSET: ON-GOING
ONSET: ON-GOING

## 2024-06-13 ASSESSMENT — PAIN - FUNCTIONAL ASSESSMENT
PAIN_FUNCTIONAL_ASSESSMENT: ACTIVITIES ARE NOT PREVENTED
PAIN_FUNCTIONAL_ASSESSMENT: PREVENTS OR INTERFERES SOME ACTIVE ACTIVITIES AND ADLS
PAIN_FUNCTIONAL_ASSESSMENT: ACTIVITIES ARE NOT PREVENTED

## 2024-06-13 ASSESSMENT — PAIN SCALES - GENERAL
PAINLEVEL_OUTOF10: 3
PAINLEVEL_OUTOF10: 3
PAINLEVEL_OUTOF10: 8

## 2024-06-13 ASSESSMENT — PAIN DESCRIPTION - ORIENTATION
ORIENTATION: LOWER

## 2024-06-13 ASSESSMENT — PAIN DESCRIPTION - DESCRIPTORS
DESCRIPTORS: ACHING
DESCRIPTORS: SORE
DESCRIPTORS: ACHING

## 2024-06-13 ASSESSMENT — PAIN DESCRIPTION - LOCATION
LOCATION: BACK;LEG
LOCATION: BACK;LEG
LOCATION: BACK

## 2024-06-13 ASSESSMENT — PAIN DESCRIPTION - PAIN TYPE
TYPE: SURGICAL PAIN

## 2024-06-13 ASSESSMENT — PAIN DESCRIPTION - FREQUENCY
FREQUENCY: INTERMITTENT
FREQUENCY: CONTINUOUS
FREQUENCY: CONTINUOUS

## 2024-06-13 NOTE — PLAN OF CARE
Problem: Pain  Goal: Verbalizes/displays adequate comfort level or baseline comfort level  6/13/2024 0720 by Kavita Renee RN  Outcome: Progressing     Problem: Safety - Adult  Goal: Free from fall injury  6/13/2024 0720 by Kavita Renee RN  Outcome: Progressing     Problem: Discharge Planning  Goal: Discharge to home or other facility with appropriate resources  6/13/2024 0720 by Kavita Renee RN  Outcome: Progressing     Problem: ABCDS Injury Assessment  Goal: Absence of physical injury  6/13/2024 0720 by Kavita Renee RN  Outcome: Progressing

## 2024-06-13 NOTE — PROGRESS NOTES
1928- Bedside and Verbal shift change report given to ISRA Walls (oncoming nurse) by ISRA Ferrell (offgoing nurse). Report included the following information Nurse Handoff Report, Adult Overview, Surgery Report, Intake/Output, MAR, and Recent Results.      2020- Pt ambulated to the bathroom using a walker with steady gait.    2027-- Patient A&Ox4, O2 at  2LPM. Denies chest pain and SOB. With honey comb  dressing to low mid back C/D/I.  Denies numbness/tingling/calf pain.  Pain 5/10 with a tolerable level of 4/10. With sequential compression device bilaterally. Pt educated on unit routine, IS use, q2h rounds, and pain management. Pt verbalized understanding, no concerns voiced. Call bell and telephone within reach, bed in lowest position. Pt encouraged to call for assistance via call bell/zone phone.     0745- Bedside and Verbal shift change report given to ISRA Singh (oncoming nurse) by ISRA Walls (offgoing nurse). Report included the following information Nurse Handoff Report, Adult Overview, Surgery Report, Intake/Output, MAR, and Recent Results.

## 2024-06-13 NOTE — PROGRESS NOTES
AVS reviewed with pt, all questions answered. Pt educated on medication uses and side effects. Nurse reviewed physician discharge instruction with pt and handout was given. Pt verbalized understanding. IV removed, no complications. Pt in chair waiting for transport.

## 2024-06-13 NOTE — PROGRESS NOTES
1650: Pt arrived to unit from PACU, Bedside report received from ISRA Aguilar (offgoing nurse) to ISRA Ferrell (oncoming nurse), bedside report included nurse Handoff Report, MAR, & SBAR, Pt observed resting in bed at safe position, Pt without any issues. Call light within reach, ongoing monitoring in place.    1715: Shift assessment completed, Pt stable at this moment, no s/sx of distress, A&O x4, some confusion noted however, Pt able to state person, place, time and situation, neuro WDL, chest rise and fall equally, no SOB noted, HOB elevated, LS CTA in all lobes bilaterally A&P, IS use encouraged, HS S1S2 stable & reg, ABD is soft, non-distended & non-tender, Bs x4 normoactive, +2 palpable peripheral pulses bilaterally in all extremities, + CSM, honeycomb dressing  lower/mid back is C/D/I, 20g IV noted to Lt hand is patent & intact, 1/2 NS infusing @ 75 ml/hr w/o complications, transparent dressing in C/D/I, pain rated 5/10, ice applied to site, Pt educated on importance of pain control & ambulation, Is education provided, Pt verbalized understanding, Pt resting in bed at safe position & call bell within reach, ongoing monitoring in place.     1928: Report given to ISRA Walls (oncoming nurse) by ISRA Ferrell (offoing nurse), bedside report included Nurse Handoff Report, MAR, & labs.

## 2024-06-13 NOTE — PROGRESS NOTES
Occupational Therapy Goals:  Initiated 6/13/2024 to be met within 7-10 days.  Short Term Goals  Time Frame for Short Term Goals: 7 days  Short Term Goal 1: Patient will complete grooming at sink with supervision  Short Term Goal 2: Patient will complete bathing with supervision while adhering to back precautions  Short Term Goal 3: Patient will complete toileting with supervision  Short Term Goal 4: Patient will complete LBD with supervision and adaptive equipment as needed while adhering to back precautions    OCCUPATIONAL THERAPY EVALUATION    Patient: Vern Stevens (78 y.o. male)  Date: 6/13/2024  Primary Diagnosis: Spinal stenosis of lumbar region with neurogenic claudication [M48.062]  Spinal stenosis of lumbar region at multiple levels [M48.061]  Procedure(s) (LRB):  LUMBAR TWO/THREE, LUMBAR THREE/FOUR LAMINECTOMY  WITH C-ARM OP 23 (N/A) 1 Day Post-Op   Precautions: Fall Risk,  ,  ,  ,  , Spinal Precautions: No Bending, No Lifting, No Twisting,  ,    PLOF: Patient completed ADLs independently        ASSESSMENT :  Patient presented seated EOB with gripper socks. Patient with no visitors present for entire session.  Patient was educated on back precautions, patient verbalized understanding. Patient reports has adaptive equipment for LBD from prior surgery last year and he is familiar with using it. Patient provided Long handled sponge for bathing. Patient completed grooming contact guard assist. Patient educated on safety with RW management, verbalized understanding. Patient left seated in armchair. Patient does not have personal clothes present, reviewed adaptive dressing strategies.   Patient educated on icing, mobility schedule, verbalized understanding. Patient educated on bathing strategies, verbalized understanding.      Patient balance, mobility and decreased strength and ROM in back does impact ADL and functional mobility status.  Patient would benefit from HH at discharge to increase safety and

## 2024-06-13 NOTE — PROGRESS NOTES
0745-  Bedside and Verbal shift change report given to ISRA Singh (oncoming nurse) by ISRA Walls (offgoing nurse). Report included the following information Nurse Handoff Report, Adult Overview, Surgery Report, Intake/Output, MAR, and Recent Results. Assumed care of pt at this time. Pt sat up in bed, JOCELYNE drain laying on bed, disconnected from pt, tip intact.     0913-  Pt A&Ox 4. IV to the L hand infusing, dressing CDI. Lung sounds clear on room air. Dressing to the back CDI. SCDs intact to BLE. Pedal pulses +2, cap refill < 3sec to BLE. Pt denies trouble breathing/SOB at this time. Pt reports mild numbness to L foot that was present before surgery, states it has improved. Safety measures in place, call bell within reach.

## 2024-06-13 NOTE — OP NOTE
[address]  [address]                            OPERATIVE REPORT      PATIENT NAME: HILARIA BLOUNT                : 1945  MED REC NO: [id number]                       ROOM: [address] [place] [address] NO: [id number]                       ADMIT DATE: 2024  PROVIDER: Bhavik Ruiz MD    DATE OF SERVICE:  2024    PREOPERATIVE DIAGNOSES:  Postlaminectomy syndrome, lumbar spinal stenosis and synovial cyst, L2-L3 and L3-L4.    POSTOPERATIVE DIAGNOSES:  Postlaminectomy syndrome, lumbar spinal stenosis and synovial cyst, L2-L3 and L3-L4.    PROCEDURES PERFORMED:  L2-L3 laminectomy, medial facetectomy, foraminotomy, L3-L4 revision laminectomy, medial facetectomy, foraminotomy, and resection of synovial cyst.    SURGEON:  Bhavik Ruiz MD    ASSISTANT:  Cherelle Valdes PA-C    ANESTHESIA:  gea    ESTIMATED BLOOD LOSS:  Less than 100 mL.    SPECIMENS REMOVED:  None.    INTRAOPERATIVE FINDINGS:  The patient had dramatic stenosis at L3-L4, greater than L2-L3 with facet and ligamentous hypertrophy and dense scarification.  A thorough decompression was done of the epidural space from pedicle to pedicle.  A wide laminectomy was accomplished.  Dura was intact.  The spine appeared to maintain inherent stability.     COMPLICATIONS:  None.    IMPLANTS:  None.    INDICATIONS:  Incapacitating back and leg pain.  Inability to ambulate.    DESCRIPTION OF PROCEDURE:  Following the induction of general endotracheal anesthesia, the patient was turned into prone position on a spinal frame.  The patient was prepped and draped in the usual fashion.  A midline incision was made.  A paramedian incision was made in the lumbodorsal fascia and subperiosteal dissection was done from the pedicle 2 to the pedicle 4.  C-arm image was verified at the surgical level.  The spine was debrided.  The previous laminectomy site was defined as best possible given the dense

## 2024-06-13 NOTE — CARE COORDINATION
Discharge Plan: Home      CM NW met with patient at bedside. Pt is s/p  Lumbar Two/Three, Lumbar Three/Four Laminectomy. Patient has a follow up with Dr. Ruiz and post op pain medication prescribed. Pt family to provide transportation at discharge. CM will follow up with pts spouse out PT options. CM called pts spouse Jaciel no answer. CM to continue to follow for discharge planning.

## 2024-06-13 NOTE — DISCHARGE SUMMARY
Discharge/Transfer  Summary     Patient: Vern Stevens MRN: [id number]  SSN: [id number]    YOB: 1945  Age: 78 y.o.  Sex: male       Admit Date: 6/12/2024    Discharge Date: 6/13/2024      Admission Diagnoses: Spinal stenosis of lumbar region with neurogenic claudication [M48.062]  Spinal stenosis of lumbar region at multiple levels [M48.061]    Discharge Diagnoses:      Discharge Condition: Good      Surgery: LUMBAR TWO/THREE, LUMBAR THREE/FOUR LAMINECTOMY  WITH C-ARM OP 23: [address] (CPT®)     Procedure(s) (LRB):  LUMBAR TWO/THREE, LUMBAR THREE/FOUR LAMINECTOMY  WITH C-ARM OP 23 (N/A)       Hospital Course: benign      Disposition: home    Discharge Medications:   Current Discharge Medication List        START taking these medications    Details   oxyCODONE (ROXICODONE) 5 MG immediate release tablet Take 1 tablet by mouth every 6 hours as needed for Pain for up to 7 days. Intended supply: 7 days. Take lowest dose possible to manage pain Max Daily Amount: 20 mg  Qty: 28 tablet, Refills: 0    Comments: Reduce doses taken as pain becomes manageable  Associated Diagnoses: S/P laminectomy           CONTINUE these medications which have NOT CHANGED    Details   Ascorbic Acid (VITAMIN C) 1000 MG tablet Take 1 tablet by mouth daily      SELENIUM PO Take 200 mg by mouth daily      Resveratrol 100 MG CAPS Take 200 mg by mouth daily      Zinc 22.5 MG TABS Take by mouth daily      l-arginine 500 MG capsule Take 1 capsule by mouth daily      Digestive Enzymes (PAPAYA AND ENZYMES PO) Take 3 tablets by mouth daily      esomeprazole Magnesium (NEXIUM) 20 MG PACK Take 1 packet by mouth daily as needed      metoprolol succinate (TOPROL XL) 50 MG extended release tablet Take 1 tablet by mouth daily      hydroCHLOROthiazide (HYDRODIURIL) 25 MG tablet Take 1 tablet by mouth daily      lisinopril (PRINIVIL;ZESTRIL) 10 MG tablet Take 1 tablet by mouth daily      simvastatin (ZOCOR) 40 MG tablet Take 1 tablet by mouth daily

## 2024-06-13 NOTE — PROGRESS NOTES
Physical Therapy Goals  Initiated 6/13/2024 and to be met within 3 days.  Patient will supine to/from sit with SBA.  Patient will perform stand transfers with SB/CGA.  Patient will ambulate 50 ft with RW and SB-CGA.  Patient will negotiate 4 stairs with handrails and CGA.    [x]  Patient has met MD mike kwong for d/c home   [x]  Recommend HH with 24 hour adult care   []  Benefit from additional acute PT session to address:      PHYSICAL THERAPY EVALUATION    Patient: Vern Stevens (78 y.o. male)  Date: 6/13/2024  Primary Diagnosis: Spinal stenosis of lumbar region with neurogenic claudication [M48.062]  Spinal stenosis of lumbar region at multiple levels [M48.061]  Procedure(s) (LRB):  LUMBAR TWO/THREE, LUMBAR THREE/FOUR LAMINECTOMY  WITH C-ARM OP 23 (N/A) 1 Day Post-Op   Precautions: Fall Risk, Spinal Precautions: No Bending, No Lifting, No Twisting  PLOF: Independent ambulation without AD. Lives with wife.    ASSESSMENT :  Based on the objective data described below, the patient presents with lower extremity weakness, decreased gait quality and endurance, impaired bed mobility and transfers, and overall limitations in functional mobility s/p L2-4 laminectomy. Pt performed sit to stand with CGA. Patient ambulated 150 feet with RW, GB applied, CGA. Pt negotiated 5 stairs with handrails and CGA. Cues for upright posture during ambulation. Pt educated on icing, elevation, positioning, log rolling, spinal precautions, home safety, home exercise program, and activity recommendations. Home health physical therapy is recommended upon discharge from hospital. Recommend 24 hour supervision/caregiver assistance upon d/c.    DEFICITS/IMPAIRMENTS:    , Body Structures, Functions, Activity Limitations Requiring Skilled Therapeutic Intervention: Decreased functional mobility ;Decreased ROM;Decreased strength;Decreased sensation;Decreased balance;Increased pain    Patient will benefit from skilled intervention to

## 2024-06-14 NOTE — ANESTHESIA POSTPROCEDURE EVALUATION
Department of Anesthesiology  Postprocedure Note    Patient: Vern Stevens  MRN: [id number]  YOB: 1945  Date of evaluation: 6/14/2024    Procedure Summary       Date: 06/12/24 Room / Location: [facility] MAIN 07 / [facility] MAIN OR    Anesthesia Start: 1237 Anesthesia Stop: 1527    Procedure: LUMBAR TWO/THREE, LUMBAR THREE/FOUR LAMINECTOMY  WITH C-ARM OP 23 (Spine Lumbar) Diagnosis:       Spinal stenosis of lumbar region with neurogenic claudication      (Spinal stenosis of lumbar region with neurogenic claudication [M48.062])    Surgeons: Bhavik Ruiz MD Responsible Provider: Jeff Mckeon MD    Anesthesia Type: General ASA Status: 2            Anesthesia Type: General    Amira Phase I: Amira Score: 8    Amira Phase II:      Anesthesia Post Evaluation    Patient location during evaluation: PACU  Patient participation: complete - patient participated  Level of consciousness: awake and alert  Airway patency: patent  Nausea & Vomiting: no nausea and no vomiting  Cardiovascular status: blood pressure returned to baseline  Respiratory status: acceptable  Hydration status: euvolemic  Pain management: adequate    No notable events documented.

## 2024-06-15 ENCOUNTER — HOME CARE VISIT (OUTPATIENT)
Age: 79
End: 2024-06-15
Payer: MEDICARE

## 2024-06-15 VITALS
TEMPERATURE: 98.7 F | HEART RATE: 110 BPM | OXYGEN SATURATION: 94 % | SYSTOLIC BLOOD PRESSURE: 150 MMHG | RESPIRATION RATE: 18 BRPM | DIASTOLIC BLOOD PRESSURE: 80 MMHG

## 2024-06-15 PROCEDURE — 400013 HH SOC

## 2024-06-15 PROCEDURE — 1090000001 HH PPS REVENUE CREDIT

## 2024-06-15 PROCEDURE — G0299 HHS/HOSPICE OF RN EA 15 MIN: HCPCS

## 2024-06-15 PROCEDURE — 0221000100 HH NO PAY CLAIM PROCEDURE

## 2024-06-15 PROCEDURE — 1090000002 HH PPS REVENUE DEBIT

## 2024-06-16 ENCOUNTER — HOME CARE VISIT (OUTPATIENT)
Age: 79
End: 2024-06-16
Payer: MEDICARE

## 2024-06-16 VITALS
OXYGEN SATURATION: 98 % | SYSTOLIC BLOOD PRESSURE: 135 MMHG | DIASTOLIC BLOOD PRESSURE: 70 MMHG | HEART RATE: 100 BPM | RESPIRATION RATE: 18 BRPM | TEMPERATURE: 98.2 F

## 2024-06-16 PROCEDURE — G0151 HHCP-SERV OF PT,EA 15 MIN: HCPCS

## 2024-06-16 PROCEDURE — 1090000001 HH PPS REVENUE CREDIT

## 2024-06-16 PROCEDURE — 1090000002 HH PPS REVENUE DEBIT

## 2024-06-16 NOTE — HOME HEALTH
Per discharge summary:    LUMBAR TWO/THREE, LUMBAR THREE/FOUR LAMINECTOMY  WITH C-ARM OP 23 (N/A)     PMHx:   Anxiety   Arthritis   Cancer (HCC)   basal,squamous   GERD (gastroesophageal reflux disease)   diet controlled   Hyperlipidemia   Hypertension   Vertigo   Wears glasse       SUBJECTIVE: pain on back 7/10, difficulty with transfers and gait  LIVING SITUATION/PLOF: Lives with wife in a 2 level house with spouse with 3 steps to enter main floor from garage, prior to surgery, he was having difficulty performing functional activities due to pain having his wife assist with daily routines and was using RW for gait.  CAREGIVER INVOLVEMENT/ ASSISTANCE NEEDED FOR: spouse for transportation, meal prep, S with mobility/ADL's  MEDICATIONS REVIEWED AND UPDATED:no change in medicine since last SN visit   NEXT MD APPOINTMENT: Dr. Bhavik Ruiz 6/24/24    EQUIIPMENT: RW  ROM: BLE wfl  STRENGTH: L hip and knee flexor and extensor 3-/5, R hip and knee flexor and extensor 4-/5  WOUNDS: has intact dressing on back, no drainage noted   BED MOBILITY: Min A with verbal cues for log rolling technique   TRANSFERS: MIn A with verbal cues to and from bed, chair, toilet transfers   GAIT: Patient was able to ambulate with RW from living room to bedroom with supervision, Gait characterized by decreased L hip flexion during swing phase, forward head posture. PT adjusted wwalker height and educated patient to maintain upright posture to comply with  lumbar precaution  STAIRS: has <3 steps to enter/exit house from garage and needed Min A  BALANCE: Tinetti 11/28 high fall risk due to reduced strength on BLE, gait deviation and decreased efficiency of balance reactions. Patient demonstrated poor use of hip and ankle strategy during standing balance activity. Patient requires support from AD at all times for safety and stability.    HEP consisting of:walking with RW every hour                             ice x 20 minutes on back every hour

## 2024-06-17 PROCEDURE — 1090000001 HH PPS REVENUE CREDIT

## 2024-06-17 PROCEDURE — 1090000002 HH PPS REVENUE DEBIT

## 2024-06-17 ASSESSMENT — ENCOUNTER SYMPTOMS
DIARRHEA: 1
INDIGESTION: 1

## 2024-06-17 NOTE — HOME HEALTH
Skilled services/Home bound verification:     Skilled Reason for admission/summary of clinical condition:   PDGM Dx: Laminectomy .Found in: CC Document: H/P:OP note Date: 6/9/24 6/12/24     Disciplines requested: SN and HHA     Services to provide: Evaluate and Treat, Medication and Disease process education.  This patient is homebound for the following reasons Requires considerable and taxing effort to leave the home , Requires the assistance of 1 or more persons to leave the home  and Only leaves the home for medical reasons or [employer] services and are infrequent and of short duration for other reasons .    Caregiver: relative.  Caregiver assists with transferring, ADL's, transportation to MD appt, household chores, meal prep.    Medications reconciled and all medications are available in the home this visit.      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  Medications  are effective at this time.      High risk medication teaching regarding anticoagulants, antiplatelets, antibiotics, antipsychotics, hypoglycemic agents, or opioid/narcotics performed (specify): oxycodone, how to better control pain with use of ice packs every hours for 20 minutes, tylenol and oxycodone at intervals.    MD notified of any discrepancies/look a like medications/medication interactions none found.     Home health supplies by type and quantity ordered/delivered this visit include: incision bandage     Patient education provided this visit to include: pt was anxious about having assistance, wife is elderly and granddaughters are visiting to help assist temporarily.  Pt states he's feeling like his pain prior to surgery is better, during visit pt was visibly uncomfortable.  Educated pt on use of ice to surgical site and warm moist heat to muscle spasms. Pt hadn't taken his medication in the am prior, heart rate was elevated and

## 2024-06-17 NOTE — CASE COMMUNICATION
[facility] for laminectomy with Dr. Ruiz for post operative management and education, frequency of 1wk3, 2 PRN.  HHA for assistance with showering, dressing, meal prep and tidying pt areas frequency of 2wk2.  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    Thank you,   Geno Marina RN, BSN, CWOCN

## 2024-06-18 PROCEDURE — 1090000001 HH PPS REVENUE CREDIT

## 2024-06-18 PROCEDURE — 1090000002 HH PPS REVENUE DEBIT

## 2024-06-19 ENCOUNTER — HOME CARE VISIT (OUTPATIENT)
Age: 79
End: 2024-06-19
Payer: MEDICARE

## 2024-06-19 VITALS
RESPIRATION RATE: 17 BRPM | DIASTOLIC BLOOD PRESSURE: 65 MMHG | SYSTOLIC BLOOD PRESSURE: 112 MMHG | TEMPERATURE: 97.6 F | OXYGEN SATURATION: 98 % | HEART RATE: 72 BPM

## 2024-06-19 PROCEDURE — 1090000002 HH PPS REVENUE DEBIT

## 2024-06-19 PROCEDURE — G0157 HHC PT ASSISTANT EA 15: HCPCS

## 2024-06-19 PROCEDURE — 1090000001 HH PPS REVENUE CREDIT

## 2024-06-19 PROCEDURE — G0300 HHS/HOSPICE OF LPN EA 15 MIN: HCPCS

## 2024-06-19 ASSESSMENT — ENCOUNTER SYMPTOMS: STOOL DESCRIPTION: SOFT FORMED

## 2024-06-20 PROCEDURE — 1090000002 HH PPS REVENUE DEBIT

## 2024-06-20 PROCEDURE — 1090000001 HH PPS REVENUE CREDIT

## 2024-06-21 ENCOUNTER — HOME CARE VISIT (OUTPATIENT)
Age: 79
End: 2024-06-21
Payer: MEDICARE

## 2024-06-21 VITALS
HEART RATE: 60 BPM | TEMPERATURE: 98.4 F | OXYGEN SATURATION: 98 % | SYSTOLIC BLOOD PRESSURE: 104 MMHG | DIASTOLIC BLOOD PRESSURE: 68 MMHG | DIASTOLIC BLOOD PRESSURE: 60 MMHG | HEART RATE: 70 BPM | OXYGEN SATURATION: 98 % | SYSTOLIC BLOOD PRESSURE: 116 MMHG | TEMPERATURE: 99.1 F

## 2024-06-21 PROCEDURE — 1090000001 HH PPS REVENUE CREDIT

## 2024-06-21 PROCEDURE — 1090000002 HH PPS REVENUE DEBIT

## 2024-06-21 PROCEDURE — G0157 HHC PT ASSISTANT EA 15: HCPCS

## 2024-06-21 NOTE — HOME HEALTH
SUBJECTIVE: \" The pain is better then what it was prior to the surgery, but it still hurts. It is worse in the morning when I am just getting up and then it feels better.\" Denies any falls, any difficulty with eating or taking in liquids. Stated that he is having BM, but not back to normal yet. Voiced using pain meds as directed, but not using ice as much.    CAREGIVER INVOLVEMENT/ASSISTANCE NEEDED FOR: Wife returned home during the session. Wife provides assistance with getting food, errands, cleaning, and retrieving items that he needs.  .  OBJECTIVE:  See interventions.    PATIENT EDUCATION PROVIDED THIS VISIT: 1. Follow precautions of no bending, lifting, or turning until cleared by MD. 2. Limit sitting in chair to 20-30 minutes at a time to decrease buttocks and hip pressure. 3. Rotate between sitting up, lying on the bed and walking during the day. Educated that position changing is important. 4. Use pain meds as prescribed to keep pain under control and at a tolerable level. 5. Use walker for ambulation to decrease risk of falls.6. Educated on fall risk associated with all the rugs on the floor and the wheels of the walker.    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 Patient had several questions about the rotating schedule, so re-education and further instruction was provided.      PATIENT RESPONSE TO TREATMENT: Patient was encouraged by his progress and understands that it takes time to heal after having surgery.     .  ASSESSMENT OF PROGRESS TOWARD GOALS: Reviewed the goals with patient and reviewed schedule to include writting it in the admission book.  Initiated bed mobility and transfer training this session. Patient was able to log roll safely, but cautioned about the 3 steps that he uses to reach top of the bed. Therapy is needed to improve gait distance and upright posture

## 2024-06-21 NOTE — HOME HEALTH
SUBJECTIVE: \"I am just sleepy today. I woke up around 5 with the pain and had to come to the livingroom to go back to sleep.\" Denies any changes since last visit. Stated that he is eating regularly and BM are going good. Denies using any stool softener to assist with BM. Denies any falls and was able to open the door for PTA. \"I am getting more things for myself and asking my wife less to hand them to me.\"    CAREGIVER INVOLVEMENT/ASSISTANCE NEEDED FOR: Wife is present in the home and is assisting with meals, cleaning and other activities in the home.  .  OBJECTIVE:  See interventions  .  PATIENT EDUCATION PROVIDED THIS VISIT: 1. Use ice for 20 mins every hour to assist with edema around incision. 2. Continue to amb in the home every hour with walker. 3. Sit outside over the weekend if able for change in scenery. 4. Use meds as directed. 5. Watch for the rugs on the floor. 6. Use stool in the shower for safety.    PATIENT RESPONSE TO EDUCATION PROVIDED: Voiced that he would use the ice. Voiced that he is thinking of having the tub removed and getting a walk in tub/shower since both he and his wife are getting older.    PATIENT RESPONSE TO TREATMENT: Patient was able to shower this session and voiced feeling better since he is clean.  .  ASSESSMENT OF PROGRESS TOWARD GOAL: SBA X1 with shower set up and getting in and out of the shower. (I) once seated with long handle brush. PTA tried several stools and chair  that would fit in the shower and be safe to allow patient to shower. (I) stepping into and out of the shower with handle inside the shower. Will plan to review with patient again to reach (I) level.  .  PLAN FOR NEXT VISIT: Practice exiting the home and outside amb with walker. Perform Tinetti test.    THE FOLLOWING DISCHARGE PLANNING WAS DISCUSSED WITH THE PATIENT/CAREGIVER: Reviewed that patient has 2 visits with Wm for next week. Patient voiced MD follow up on Monday afternoon and that he wanted to have

## 2024-06-22 PROCEDURE — 1090000001 HH PPS REVENUE CREDIT

## 2024-06-22 PROCEDURE — 1090000002 HH PPS REVENUE DEBIT

## 2024-06-23 PROCEDURE — 1090000002 HH PPS REVENUE DEBIT

## 2024-06-23 PROCEDURE — 1090000001 HH PPS REVENUE CREDIT

## 2024-06-24 ENCOUNTER — HOME CARE VISIT (OUTPATIENT)
Age: 79
End: 2024-06-24
Payer: MEDICARE

## 2024-06-25 ENCOUNTER — HOME CARE VISIT (OUTPATIENT)
Age: 79
End: 2024-06-25
Payer: MEDICARE

## 2024-06-25 VITALS
RESPIRATION RATE: 18 BRPM | TEMPERATURE: 98.2 F | OXYGEN SATURATION: 97 % | SYSTOLIC BLOOD PRESSURE: 102 MMHG | HEART RATE: 55 BPM | DIASTOLIC BLOOD PRESSURE: 62 MMHG

## 2024-06-25 VITALS
HEART RATE: 62 BPM | TEMPERATURE: 97.7 F | DIASTOLIC BLOOD PRESSURE: 68 MMHG | RESPIRATION RATE: 16 BRPM | OXYGEN SATURATION: 96 % | SYSTOLIC BLOOD PRESSURE: 110 MMHG

## 2024-06-25 PROCEDURE — G0157 HHC PT ASSISTANT EA 15: HCPCS

## 2024-06-25 PROCEDURE — G0299 HHS/HOSPICE OF RN EA 15 MIN: HCPCS

## 2024-06-25 ASSESSMENT — ENCOUNTER SYMPTOMS: PAIN LOCATION - PAIN QUALITY: ACHY, SORE

## 2024-06-25 NOTE — HOME HEALTH
SUBJECTIVE: Patient reports that he didn't sleep well last night. Reports increased pain overnight. Reports having   CAREGIVER INVOLVEMENT/ASSISTANCE NEEDED FOR: Wife is present and assists with IADLs as needed  .  OBJECTIVE:  See interventions.  PATIENT EDUCATION PROVIDED THIS VISIT: Edema management, ice pack use x 10 minutes every hour, HEP, fall prevention, infection prevention, gait training, bed mobility training, transfer training.  PATIENT RESPONSE TO EDUCATION PROVIDED: Patient verbalized understanding and return demonstration of techniques  PATIENT RESPONSE TO TREATMENT: Patient reports 3/10 ABENA RPE post gait training, 3/10 ABENA RPE post ther ex, and 4/10 pain levels post treatment.   .  ASSESSMENT OF PROGRESS TOWARD GOALS: Patient is making progress toward goals noted by improvements in gait distance to 125 feet with FWW and SPV, improvements in HEP program. Patient is limited secondary to pain, impaired balance, decreased strength.  Patient would benefit from continued skilled PT to improve strength, balance and independent mobility.   .  PLAN FOR NEXT VISIT: balance training, gait training  THE FOLLOWING DISCHARGE PLANNING WAS DISCUSSED WITH THE PATIENT/CAREGIVER: 2w1 with discharge to self and MD following. Patient verbalized understanding and in agreement with discharge plan.

## 2024-06-27 ENCOUNTER — HOME CARE VISIT (OUTPATIENT)
Age: 79
End: 2024-06-27
Payer: MEDICARE

## 2024-06-27 VITALS
OXYGEN SATURATION: 98 % | SYSTOLIC BLOOD PRESSURE: 120 MMHG | HEART RATE: 64 BPM | TEMPERATURE: 98 F | RESPIRATION RATE: 17 BRPM | DIASTOLIC BLOOD PRESSURE: 72 MMHG

## 2024-06-27 PROCEDURE — G0157 HHC PT ASSISTANT EA 15: HCPCS

## 2024-06-27 ASSESSMENT — ENCOUNTER SYMPTOMS: PAIN LOCATION - PAIN QUALITY: SORE, ACHY

## 2024-06-27 NOTE — HOME HEALTH
over indoor/outdoor surfaces with mod I. Some short distance ambulation with no AD but limited secondary to fatigue and pain.   Stairs: 4 steps to enter/exit home with FWW and 1 HR support and mod I with technique.   Special Tests: Tinetti: 25/28 indicating low risk for falls  Recommendations: Patient would benefit form continued skilled PT to improve strength, mobility and balance.

## 2024-06-27 NOTE — HOME HEALTH
Skilled reason for visit: incision with bandage that was changed yesterday by MD , no drainage noted on bandage.    Caregiver involvement: wife cares for all needs .    Medications reviewed and all medications are available in the home this visit.    The following education was provided regarding medications:  all meds are in the home and he is taking them as ordered .    MD notified of any discrepancies/look a-like medications/medication interactions: none  Medications are effective at this time.      Home health supplies by type and quantity ordered/delivered this visit include: none    Patient education provided this visit: SN educated on when to call MD , s/s of infection , using ice ,     Sharps education provided: NA    Patient level of understanding of education provided: patient verbalized understanding    Patient response to procedure performed:  NA    Agency Progress toward goals: progressing    Patient's Progress towards personal goals: all goals met at this time     Home exercise program: deep breathing     Continued need for the following skills: Physical Therapy.    Plan for next visit: NA    Patient and/or caregiver notified and agrees to changes in the Plan of Care: N/A.     The following discharge planning was discussed with the pt/caregiver: today from nursing

## 2024-06-28 ENCOUNTER — HOME CARE VISIT (OUTPATIENT)
Age: 79
End: 2024-06-28
Payer: MEDICARE

## 2024-06-29 ENCOUNTER — HOME CARE VISIT (OUTPATIENT)
Age: 79
End: 2024-06-29
Payer: MEDICARE

## 2024-06-29 VITALS
DIASTOLIC BLOOD PRESSURE: 80 MMHG | TEMPERATURE: 97.8 F | SYSTOLIC BLOOD PRESSURE: 120 MMHG | OXYGEN SATURATION: 98 % | RESPIRATION RATE: 18 BRPM | HEART RATE: 77 BPM

## 2024-06-29 PROCEDURE — G0151 HHCP-SERV OF PT,EA 15 MIN: HCPCS

## 2024-06-29 ASSESSMENT — ENCOUNTER SYMPTOMS: PAIN LOCATION - PAIN QUALITY: ACHING

## 2024-06-29 NOTE — HOME HEALTH
SUBJECTIVE pleased with progress achieved   CAREGIVER ASSISTANCE:spouse will be able to assist as needed   MEDICATIONS REVIEWED AND UPDATED: no changes in medications at this time  WOUND back incision, no draiange noted, healing well   ROM wfl  BED MOBILITY independent   TRANSFERS independent   GAIT TRAINING independent with RW  STAIRS n/a  THERAPEUTIC EXERCISES independent   BALANCE Tinetti 26/28  PATIENT/CAREGIVER EDUCATION THIS VISIT:  patient provided with skilled PT intervention consisting of graded therapeutic exercises, gait training, balance training, safe transfers training, caregiver education and Home exercise program education. Patient at time of discharge was able to demonstrate independence with bed mobility, transfers and gait with RW inside and outside the house. pt. also noted that he is doing HEP 1x a day.    PLAN DC at this time due to goals are met  DISCHARGE PLANNING DISCUSSED: dc to self and family under MD supervision

## (undated) DEVICE — GLOVE SURG SZ 85 L12IN FNGR THK79MIL GRN LTX FREE

## (undated) DEVICE — ADHESIVE SKIN CLOSURE WND 8.661X1.5 IN 22 CM LIQUIBAND SECUR

## (undated) DEVICE — SOLUTION IRRIG 500ML 0.9% SOD CHLO USP POUR PLAS BTL

## (undated) DEVICE — SHEET,DRAPE,70X100,STERILE: Brand: MEDLINE

## (undated) DEVICE — RESERVOIR,SUCTION,100CC,SILICONE: Brand: MEDLINE

## (undated) DEVICE — GLOVE ORANGE PI 8   MSG9080

## (undated) DEVICE — SUTURE STRATAFIX SYMMETRIC PDS + SZ 1 L18IN ABSRB VLT L48MM SXPP1A400

## (undated) DEVICE — DRAIN SURG W7MMXL20CM SIL FULL PERF HUBLESS FLAT RADPQ STRP

## (undated) DEVICE — MARKER,SKIN,WI/RULER AND LABELS: Brand: MEDLINE

## (undated) DEVICE — 3M™ TEGADERM™ TRANSPARENT FILM DRESSING FRAME STYLE, 1626W, 4 IN X 4-3/4 IN (10 CM X 12 CM), 50/CT 4CT/CASE: Brand: 3M™ TEGADERM™

## (undated) DEVICE — SURGIFOAM SPNG SZ 100

## (undated) DEVICE — 3.0MM PRECISION NEURO (MATCH HEAD)

## (undated) DEVICE — C-ARMOR C-ARM EQUIPMENT COVERS CLEAR STERILE UNIVERSAL FIT 12 PER CASE: Brand: C-ARMOR

## (undated) DEVICE — GARMENT,MEDLINE,DVT,INT,CALF,MED, GEN2: Brand: MEDLINE

## (undated) DEVICE — ELECTRODE PT RET AD L9FT HI MOIST COND ADH HYDRGEL CORDED

## (undated) DEVICE — X-RAY DETECTABLE SPONGES,12 PLY: Brand: VISTEC

## (undated) DEVICE — SPONGE,NEURO,0.5"X0.5",XR,STRL,10/PK: Brand: MEDLINE

## (undated) DEVICE — DRESSING FOAM DISK DIA1IN H 7MM HYDRPHLC CHG IMPREG IN SL

## (undated) DEVICE — HYPODERMIC SAFETY NEEDLE: Brand: MAGELLAN

## (undated) DEVICE — SUTURE MONOCRYL SZ 3-0 L27IN ABSRB UD PS-2 3/8 CIR REV CUT NDL MCP427H

## (undated) DEVICE — WATERPROOF, BACTERIA PROOF DRESSING WITH ABSORBENT SEE THROUGH PAD: Brand: OPSITE POST-OP VISIBLE 15X10CM CTN 20

## (undated) DEVICE — SUTURE VICRYL + SZ 2-0 L27IN ABSRB UD CT-2 L26MM 1/2 CIR TAPR VCP269H

## (undated) DEVICE — 3.0MM DIAMOND NEURO (MATCH HEAD)

## (undated) DEVICE — INTENDED FOR TISSUE SEPARATION, AND OTHER PROCEDURES THAT REQUIRE A SHARP SURGICAL BLADE TO PUNCTURE OR CUT.: Brand: BARD-PARKER SAFETY BLADES SIZE 20, STERILE

## (undated) DEVICE — JACKSON TABLE POSITIONER KIT: Brand: MEDLINE INDUSTRIES, INC.

## (undated) DEVICE — PACK PROCEDURE SURG LAMINECTOMY SPINE CUST

## (undated) DEVICE — APPLICATOR MEDICATED 26 CC SOLUTION HI LT ORNG CHLORAPREP